# Patient Record
Sex: MALE | Race: BLACK OR AFRICAN AMERICAN | Employment: UNEMPLOYED | URBAN - NONMETROPOLITAN AREA
[De-identification: names, ages, dates, MRNs, and addresses within clinical notes are randomized per-mention and may not be internally consistent; named-entity substitution may affect disease eponyms.]

---

## 2017-01-26 ENCOUNTER — TELEMEDICINE2 (OUTPATIENT)
Dept: MEDICAL GROUP | Facility: PHYSICIAN GROUP | Age: 77
End: 2017-01-26
Payer: OTHER GOVERNMENT

## 2017-01-26 VITALS
WEIGHT: 192 LBS | RESPIRATION RATE: 20 BRPM | BODY MASS INDEX: 27.49 KG/M2 | HEART RATE: 63 BPM | DIASTOLIC BLOOD PRESSURE: 85 MMHG | HEIGHT: 70 IN | OXYGEN SATURATION: 95 % | SYSTOLIC BLOOD PRESSURE: 135 MMHG | TEMPERATURE: 96.8 F

## 2017-01-26 DIAGNOSIS — B20 HIV (HUMAN IMMUNODEFICIENCY VIRUS INFECTION) (HCC): ICD-10-CM

## 2017-01-26 PROCEDURE — 99213 OFFICE O/P EST LOW 20 MIN: CPT | Mod: GT | Performed by: NURSE PRACTITIONER

## 2017-01-26 NOTE — PROGRESS NOTES
"Chief Complaint   Patient presents with   • HIV Positive/AIDS       TELEMEDICINE VISIT    HISTORY OF PRESENT ILLNESS: Patient is a 78 y.o. male established patient, who presents today to discuss:    Verified Identification with patient.    Secured visit with RN presenter @  [facility]    HIV (human immunodeficiency virus infection)  Previous CD4: 842 viral load < 20  Today CD4 - 936 viral load < 20  BUN and Creatinine are improved GFR improved.  Platelets at 118K.  Patient is  sup for parole in 3 months. Will be relocated to  [place] and wants to go to hospitals clinic.    HIV ROS     Medication: taking - prezista, isentress, and norvir  Missed medications: NONE    Weight loss? NO     Night sweats?  NO   Body aches ?   NO     Skin ?  Clear, although has problems with mouth lesions Nonhealing lesions ? NO  Rash  ? NO  Warts  ?  NO     Neuro: No headache, No sensation changes, No dizziness, No confusion.  Cardiac: No cp, No Castro, No peripheral edema. No calf pain at rest.  Pulm: No cough, No sob, No sputum   Gastro: No nausea, No vomiting,No diarrhea, No rectal bleeding, No abdominal pain  : No dysuria. No blisters, No incontinence.   Constitutional : No fevers, No night sweats.  Musculoskeletal: No swelling,redness or pain to joints. Normal ROM and gait  Skin: rashes, lesions, itching  Emotional: depression ? No  anxiety ? No    Psych: hallucinations ?  Auditory hallucinations ? Paranoid  ?        Allergies   Allergen Reactions   • Pcn [Penicillins]          ROS: As documented in my HPI      Exam:  Blood pressure 135/85, pulse 63, temperature 36 °C (96.8 °F), resp. rate 20, height 1.778 m (5' 10\"), weight 87.091 kg (192 lb), SpO2 95 %.  General:  Well nourished, well developed male in NAD  Head: No lesions, Non tender scalp  Neck: Supple. Thyroid is symmetric. No lymphadenopathy   Oral Cavity: no thrush or gum lesions  Pulmonary: .  Normal effort. No rales, ronchi, or wheezing via Telesteth system  Cardiovascular: Regular rate " and rhythm without murmur.   Extremities: no clubbing, cyanosis, or edema.  Psych: Alert and oriented x3. Normal mood and affect.  Neurological: No focal deficits    Please note that this dictation was created using voice recognition software. I have made every reasonable attempt to correct obvious errors, but I expect that there are errors of grammar and possibly content that I did not discover before finalizing the note.    Assessment/Plan:  1. HIV (human immunodeficiency virus infection) (CMS-[facility])     Current status of condition is chronic and controlled on therapy.  Return to clinic in 3 months  Refill HIV meds  Recheck CD4 , VL, CBC, and CMP in 3 months

## 2017-01-26 NOTE — ASSESSMENT & PLAN NOTE
Previous CD4: 842 viral load < 20  Today CD4 - 936 viral load < 20  BUN and Creatinine are improved GFR improved.  Platelets at 118K.  Patient is  sup for parole in 3 months. Will be relocated to  [place] and wants to go to [facility] [place] [facility].    HIV ROS     Medication: taking - prezista, isentress, and norvir  Missed medications: NONE    Weight loss? NO     Night sweats?  NO   Body aches ?   NO     Skin ?  Clear, although has problems with mouth lesions Nonhealing lesions ? NO  Rash  ? NO  Warts  ?  NO     Neuro: No headache, No sensation changes, No dizziness, No confusion.  Cardiac: No cp, No Castro, No peripheral edema. No calf pain at rest.  Pulm: No cough, No sob, No sputum   Gastro: No nausea, No vomiting,No diarrhea, No rectal bleeding, No abdominal pain  : No dysuria. No blisters, No incontinence.   Constitutional : No fevers, No night sweats.  Musculoskeletal: No swelling,redness or pain to joints. Normal ROM and gait  Skin: rashes, lesions, itching  Emotional: depression ? No  anxiety ? No    Psych: hallucinations ?  Auditory hallucinations ? Paranoid  ?

## 2017-04-03 DIAGNOSIS — B20 HIV (HUMAN IMMUNODEFICIENCY VIRUS INFECTION) (HCC): ICD-10-CM

## 2017-04-03 NOTE — TELEPHONE ENCOUNTER
Last refilled 10/2016, w/Prezista and Isentress, but only given 4 refills (11 refills on other two). [facility] requesting. Thanks.

## 2017-04-12 ENCOUNTER — TELEMEDICINE2 (OUTPATIENT)
Dept: MEDICAL GROUP | Facility: PHYSICIAN GROUP | Age: 77
End: 2017-04-12
Payer: OTHER GOVERNMENT

## 2017-04-12 VITALS
DIASTOLIC BLOOD PRESSURE: 89 MMHG | BODY MASS INDEX: 26.98 KG/M2 | OXYGEN SATURATION: 95 % | HEART RATE: 85 BPM | RESPIRATION RATE: 18 BRPM | TEMPERATURE: 97.3 F | SYSTOLIC BLOOD PRESSURE: 128 MMHG | WEIGHT: 188 LBS

## 2017-04-12 DIAGNOSIS — B20 HIV (HUMAN IMMUNODEFICIENCY VIRUS INFECTION) (HCC): ICD-10-CM

## 2017-04-12 PROCEDURE — 99213 OFFICE O/P EST LOW 20 MIN: CPT | Mod: GT | Performed by: NURSE PRACTITIONER

## 2017-04-12 NOTE — PROGRESS NOTES
Chief Complaint   Patient presents with   • HIV Positive/AIDS       TELEMEDICINE VISIT    HISTORY OF PRESENT ILLNESS: Patient is a 79 y.o. male established patient, who presents today to discuss treatment and URI symptoms.    Verified Identification with patient.    Secured visit with RN presenter @ [facility]    HIV (human immunodeficiency virus infection)  This is a 79 year old male with  HIV. Patient has chronic conditions that include:  Patient Active Problem List    Diagnosis Date Noted   • HSV (herpes simplex virus) infection 04/20/2016   • Drug-induced dyspepsia 04/20/2016   • HIV (human immunodeficiency virus infection) (CMS-HCC) 04/22/2014   • Chronic kidney disease (CKD) stage G4/A1, severely decreased glomerular filtration rate (GFR) between 15-29 mL/min/1.73 square meter and albuminuria creatinine ratio less than 30 mg/g (CMS-HCC) 04/22/2014   • HTN (hypertension) 04/22/2014   • Leg pain 04/22/2014   HIV is stable. CD4 357 and viral load is < 20   Patient has chronic CKD with a creatinine at 1.95.  GFR is a 37 - stable.     Patient complains of URI, mostly sinus congestion. Sore throat is present.    HIV ROS     Medication: norvir, isentress Prezista.  Missed medications:  NO     Weight loss?  NO     Night sweats?  NO   Body aches ?   YES, with current URI    Skin ?  Clear  Nonhealing lesions ?  NO  Rash  ?  NO  Warts  ?  NO     Neuro: No headache, No sensation changes, No dizziness, No confusion.  Cardiac: No cp, No Castro, No peripheral edema. No calf pain at rest.  Pulm: COUGH and postnasal drip sore throat  Gastro: No nausea, No vomiting,No diarrhea, No rectal bleeding, No abdominal pain  : No dysuria. No blisters, No incontinence.   Constitutional : No fevers, No night sweats.  Musculoskeletal: No swelling,redness or pain to joints. Normal ROM and gait  Skin: rashes, lesions, itching  Emotional: depression ? NO  anxiety ?  NO    Cough but very dry.     Allergies   Allergen Reactions   • Pcn [Penicillins]                 ROS: As documented in my HPI      Exam:  Blood pressure 128/89, pulse 85, temperature 36.3 °C (97.3 °F), resp. rate 18, weight 85.276 kg (188 lb), SpO2 95 %.  General:  Well nourished, well developed male in NAD  Head: No lesions, Non tender scalp  Neck: Supple. Bilateral lymph swelling. Posterior pharynx red and swollen   Oral Cavity: no thrush or gum lesions  Pulmonary: .  Normal effort. No rales, ronchi, or wheezing via Telesteth system  Cardiovascular: Regular rate and rhythm without murmur.   Extremities: no clubbing, cyanosis, or edema.  Psych: Alert and oriented x3. Normal mood and affect.  Neurological: No focal deficits    Please note that this dictation was created using voice recognition software. I have made every reasonable attempt to correct obvious errors, but I expect that there are errors of grammar and possibly content that I did not discover before finalizing the note.    Assessment/Plan:  1. HIV (human immunodeficiency virus infection) (CMS-[facility])     Current status of condition is chronic and controlled on therapy.  URI: self limiting and COLD PACK ordered.

## 2017-04-12 NOTE — ASSESSMENT & PLAN NOTE
This is a 79 year old male with  HIV. Patient has chronic conditions that include:  Patient Active Problem List    Diagnosis Date Noted   • HSV (herpes simplex virus) infection 04/20/2016   • Drug-induced dyspepsia 04/20/2016   • HIV (human immunodeficiency virus infection) (CMS-HCC) 04/22/2014   • Chronic kidney disease (CKD) stage G4/A1, severely decreased glomerular filtration rate (GFR) between 15-29 mL/min/1.73 square meter and albuminuria creatinine ratio less than 30 mg/g (CMS-HCC) 04/22/2014   • HTN (hypertension) 04/22/2014   • Leg pain 04/22/2014   HIV is stable. CD4 357 and viral load is < 20   Patient has chronic CKD with a creatinine at 1.95.  GFR is a 37 - stable.     Patient complains of URI, mostly sinus congestion. Sore throat is present.    HIV ROS     Medication: norvir, isentress Prezista.  Missed medications:  NO     Weight loss?  NO     Night sweats?  NO   Body aches ?   YES, with current URI    Skin ?  Clear  Nonhealing lesions ?  NO  Rash  ?  NO  Warts  ?  NO     Neuro: No headache, No sensation changes, No dizziness, No confusion.  Cardiac: No cp, No Castro, No peripheral edema. No calf pain at rest.  Pulm: COUGH and postnasal drip sore throat  Gastro: No nausea, No vomiting,No diarrhea, No rectal bleeding, No abdominal pain  : No dysuria. No blisters, No incontinence.   Constitutional : No fevers, No night sweats.  Musculoskeletal: No swelling,redness or pain to joints. Normal ROM and gait  Skin: rashes, lesions, itching  Emotional: depression ? NO  anxiety ?  NO    Cough but very dry.

## 2017-07-12 ENCOUNTER — TELEMEDICINE2 (OUTPATIENT)
Dept: MEDICAL GROUP | Facility: PHYSICIAN GROUP | Age: 77
End: 2017-07-12
Payer: OTHER GOVERNMENT

## 2017-07-12 VITALS
SYSTOLIC BLOOD PRESSURE: 104 MMHG | BODY MASS INDEX: 25.97 KG/M2 | RESPIRATION RATE: 16 BRPM | OXYGEN SATURATION: 93 % | WEIGHT: 181 LBS | TEMPERATURE: 96.8 F | DIASTOLIC BLOOD PRESSURE: 61 MMHG | HEART RATE: 70 BPM

## 2017-07-12 DIAGNOSIS — B20 HIV (HUMAN IMMUNODEFICIENCY VIRUS INFECTION) (HCC): ICD-10-CM

## 2017-07-12 PROCEDURE — 99213 OFFICE O/P EST LOW 20 MIN: CPT | Mod: GT | Performed by: NURSE PRACTITIONER

## 2017-07-12 NOTE — PROGRESS NOTES
Chief Complaint   Patient presents with   • HIV Positive/AIDS       IN PERSON VISIT    HISTORY OF PRESENT ILLNESS: Patient is a 79 y.o. male established patient, who presents today to discuss:     Verified Identification with patient.   RN presenter @ [facility]      HIV ROS     Medication:  Prezista, isentress and norvir   Missed medications:  none    CD4 409   Viral Load < 20   Kidney Function 1.58 ( improved)    Weight loss?  No     Night sweats?  No   Body aches ?   No     Skin ? Clear  Nonhealing lesions ?  No  Rash  ?  No  Warts  ?  No     Neuro: No headache, No sensation changes, No dizziness, No confusion.  Cardiac: No cp, No Castro, No peripheral edema. No calf pain at rest.  Pulm: No cough, No sob, No sputum   Gastro: No nausea, No vomiting,No diarrhea, No rectal bleeding, No abdominal pain  : No dysuria. No blisters, No incontinence.   Constitutional : No fevers, No night sweats.  Musculoskeletal: No swelling,redness or pain to joints. Normal ROM and gait  Skin: rashes, lesions, itching  Emotional: depression ? No  anxiety ?  No    Psych: hallucinations ?   No   Auditory hallucinations ?  No  Paranoid  ?  No     DRUG ---DRUG interaction? No     Severe allergies today.     No problem-specific assessment & plan notes found for this encounter.    Allergies   Allergen Reactions   • Pcn [Penicillins]                ROS: As documented in my HPI      Exam:  Blood pressure 104/61, pulse 70, temperature 36 °C (96.8 °F), resp. rate 16, weight 82.101 kg (181 lb), SpO2 93 %.  General:  Well nourished, well developed male in NAD  Head: No lesions, Non tender scalp  EYES: severe swelling and watery eyes.   Neck: Supple. Thyroid is symmetric. No lymphadenopathy   Oral Cavity: no thrush or gum lesions  Pulmonary: .  Normal effort. No rales, ronchi, or wheezing via Telesteth system  Cardiovascular: Regular rate and rhythm without murmur.   Extremities: no clubbing, cyanosis, or edema.  Psych: Alert and oriented x3. Normal mood  and affect.  Neurological: No focal deficits    Please note that this dictation was created using voice recognition software. I have made every reasonable attempt to correct obvious errors, but I expect that there are errors of grammar and possibly content that I did not discover before finalizing the note.    Assessment/Plan:  1. HIV (human immunodeficiency virus infection) (CMS-HCC)  darunavir (PREZISTA) 600 MG Tab    raltegravir (ISENTRESS) 400 MG Tab    ritonavir (NORVIR) 100 MG Cap      Current status of condition is chronic and controlled on therapy.  Return to clinic in 3 months  Refill HIV meds  Recheck CD4 , VL, CBC, and CMP in 3 months    Use antihistamine and nasal spray as directed from [facility].

## 2017-10-11 ENCOUNTER — TELEMEDICINE2 (OUTPATIENT)
Dept: MEDICAL GROUP | Facility: PHYSICIAN GROUP | Age: 77
End: 2017-10-11
Payer: OTHER GOVERNMENT

## 2017-10-11 VITALS
SYSTOLIC BLOOD PRESSURE: 169 MMHG | HEART RATE: 57 BPM | BODY MASS INDEX: 26.77 KG/M2 | TEMPERATURE: 97.3 F | OXYGEN SATURATION: 92 % | DIASTOLIC BLOOD PRESSURE: 87 MMHG | HEIGHT: 70 IN | RESPIRATION RATE: 20 BRPM | WEIGHT: 187 LBS

## 2017-10-11 DIAGNOSIS — N18.4 CHRONIC KIDNEY DISEASE (CKD) STAGE G4/A1, SEVERELY DECREASED GLOMERULAR FILTRATION RATE (GFR) BETWEEN 15-29 ML/MIN/1.73 SQUARE METER AND ALBUMINURIA CREATININE RATIO LESS THAN 30 MG/G (HCC): ICD-10-CM

## 2017-10-11 DIAGNOSIS — B20 HIV (HUMAN IMMUNODEFICIENCY VIRUS INFECTION) (HCC): ICD-10-CM

## 2017-10-11 PROCEDURE — 99213 OFFICE O/P EST LOW 20 MIN: CPT | Mod: GT | Performed by: NURSE PRACTITIONER

## 2017-10-11 NOTE — ASSESSMENT & PLAN NOTE
Patient reports drinking 5-6 cups of hot tea a day. Patient is taking CTM for allergies. GFr - 34 ( stable and chronic). Cr: 1.86  Discussed tea and CTM - discouraged use. Plans to [place] for appt with Medical. Having mild urinary retention. Taking Flomax. Need to discuss with medical.

## 2017-10-11 NOTE — PROGRESS NOTES
"Chief Complaint   Patient presents with   • HIV Positive/AIDS           HISTORY OF PRESENT ILLNESS: Patient is a 79 y.o. male established patient, who presents today to discuss:     Verified Identification with patient.   RN presenter @ [facility]    Chronic kidney disease (CKD) stage G4/A1, severely decreased glomerular filtration rate (GFR) between 15-29 mL/min/1.73 square meter and albuminuria creatinine ratio less than 30 mg/g  Patient reports drinking 5-6 cups of hot tea a day. Patient is taking CTM for allergies. GFr - 34 ( stable and chronic). Cr: 1.86  Discussed tea and CTM - discouraged use. Plans to [place] for appt with Medical. Having mild urinary retention. Taking Flomax. Need to discuss with medical.     HIV (human immunodeficiency virus infection)  HIV ROS     Medication: Norvir, Isentress, Prezista  Missed medications: NONE     CD4: 818   Viral Load < 20   Kidney Function: Stable but worrisome     Weight loss?  NO     Night sweats?  NO   Body aches ?   NO     Skin ?  NO  Nonhealing lesions ? NO  Rash  ?  NO  Warts  ?  NO     Neuro: No headache, No sensation changes, No dizziness, No confusion.  Cardiac: No cp, No Castro, No peripheral edema. No calf pain at rest.  Pulm: No cough, No sob, No sputum   Gastro: No nausea, No vomiting,No diarrhea, No rectal bleeding, No abdominal pain  : No dysuria. No blisters, No incontinence.   Constitutional : No fevers, No night sweats.  Musculoskeletal: No swelling,redness or pain to joints. Normal ROM and gait  Skin: rashes, lesions, itching  Emotional: depression ? NO  anxiety ?  NO    Psych: hallucinations ?   NO   Auditory hallucinations ?  NO  Paranoid  ?  NO     DRUG ---DRUG interaction? NO       Allergies   Allergen Reactions   • Pcn [Penicillins]                ROS: As documented in my HPI      Exam:  Blood pressure (!) 169/87, pulse (!) 57, temperature 36.3 °C (97.3 °F), resp. rate 20, height 1.778 m (5' 10\"), weight 84.8 kg (187 lb), SpO2 92 %.  General:  Well " nourished, well developed male in NAD  Head: No lesions, Non tender scalp  Neck: Supple. Thyroid is symmetric. No lymphadenopathy   Oral Cavity: no thrush or gum lesions  Pulmonary: .  Normal effort. No rales, ronchi, or wheezing via Telesteth system  Cardiovascular: Regular rate and rhythm without murmur.   Extremities: no clubbing, cyanosis, or edema.  Psych: Alert and oriented x3. Normal mood and affect.  Neurological: No focal deficits    Please note that this dictation was created using voice recognition software. I have made every reasonable attempt to correct obvious errors, but I expect that there are errors of grammar and possibly content that I did not discover before finalizing the note.    Assessment/Plan:  1. Chronic kidney disease (CKD) stage G4/A1, severely decreased glomerular filtration rate (GFR) between 15-29 mL/min/1.73 square meter and albuminuria creatinine ratio less than 30 mg/g (CMS-HCC)   Follow up with Medical    2. HIV (human immunodeficiency virus infection) (CMS-HCC)  Current status of condition is chronic and controlled on therapy.  Return to clinic in 3 months  Refill HIV meds  Recheck CD4 , VL, CBC, and CMP in 3 months

## 2017-10-11 NOTE — ASSESSMENT & PLAN NOTE
HIV ROS     Medication: Norvir, Isentress, Prezista  Missed medications: NONE     CD4: 818   Viral Load < 20   Kidney Function: Stable but worrisome     Weight loss?  NO     Night sweats?  NO   Body aches ?   NO     Skin ?  NO  Nonhealing lesions ? NO  Rash  ?  NO  Warts  ?  NO     Neuro: No headache, No sensation changes, No dizziness, No confusion.  Cardiac: No cp, No Castro, No peripheral edema. No calf pain at rest.  Pulm: No cough, No sob, No sputum   Gastro: No nausea, No vomiting,No diarrhea, No rectal bleeding, No abdominal pain  : No dysuria. No blisters, No incontinence.   Constitutional : No fevers, No night sweats.  Musculoskeletal: No swelling,redness or pain to joints. Normal ROM and gait  Skin: rashes, lesions, itching  Emotional: depression ? NO  anxiety ?  NO    Psych: hallucinations ?   NO   Auditory hallucinations ?  NO  Paranoid  ?  NO     DRUG ---DRUG interaction? NO

## 2018-01-25 ENCOUNTER — TELEMEDICINE2 (OUTPATIENT)
Dept: MEDICAL GROUP | Facility: PHYSICIAN GROUP | Age: 78
End: 2018-01-25
Payer: OTHER GOVERNMENT

## 2018-01-25 VITALS
OXYGEN SATURATION: 95 % | WEIGHT: 183 LBS | SYSTOLIC BLOOD PRESSURE: 169 MMHG | DIASTOLIC BLOOD PRESSURE: 93 MMHG | RESPIRATION RATE: 18 BRPM | TEMPERATURE: 97.8 F | HEIGHT: 70 IN | HEART RATE: 70 BPM | BODY MASS INDEX: 26.2 KG/M2

## 2018-01-25 DIAGNOSIS — B20 HIV (HUMAN IMMUNODEFICIENCY VIRUS INFECTION) (HCC): ICD-10-CM

## 2018-01-25 DIAGNOSIS — L02.91 ABSCESS: ICD-10-CM

## 2018-01-25 DIAGNOSIS — N18.4 CHRONIC KIDNEY DISEASE (CKD) STAGE G4/A1, SEVERELY DECREASED GLOMERULAR FILTRATION RATE (GFR) BETWEEN 15-29 ML/MIN/1.73 SQUARE METER AND ALBUMINURIA CREATININE RATIO LESS THAN 30 MG/G (HCC): ICD-10-CM

## 2018-01-25 DIAGNOSIS — I10 ESSENTIAL HYPERTENSION: ICD-10-CM

## 2018-01-25 DIAGNOSIS — B00.9 HSV (HERPES SIMPLEX VIRUS) INFECTION: ICD-10-CM

## 2018-01-25 PROCEDURE — 99213 OFFICE O/P EST LOW 20 MIN: CPT | Mod: GT | Performed by: NURSE PRACTITIONER

## 2018-01-25 NOTE — PROGRESS NOTES
"Chief Complaint   Patient presents with   • HIV Positive/AIDS           HISTORY OF PRESENT ILLNESS: Patient is a 79 y.o. male established patient, who presents today to discuss:     Verified Identification with patient.   RN presenter @ [facility]    Abscess  Patient reports abscess on his anus. Packed with gauze and apparently getting better. Patient returns to clinic weekly for recheck. Taking antibiotics. Able to have BM.     HIV (human immunodeficiency virus infection)  HIV ROS     Medication: Isentress, Prezista, Norvir   Missed medications: NONE     CD4: 728   Viral Load < 20   Kidney Function: Cr: 1.84 GFR 40  ( patient goes up and down)     Weight loss?  No     Night sweats?  No   Body aches ?  No     Skin ? Abscess on Rectum  Nonhealing lesions ? Yes  Rash  ? NO  Warts  ? NO     Neuro: No headache, No sensation changes, No dizziness, No confusion.  Cardiac: No cp, No Castro, No peripheral edema. No calf pain at rest.  Pulm: No cough, No sob, No sputum   Gastro: No nausea, No vomiting,No diarrhea, No rectal bleeding, No abdominal pain  : No dysuria. No blisters, No incontinence.   Constitutional : No fevers, No night sweats.  Musculoskeletal: No swelling,redness or pain to joints. Normal ROM and gait  Skin: rashes, lesions, itching  Emotional: depression ? No  anxiety ? No    Psych: hallucinations ?  No   Auditory hallucinations ? No  Paranoid  ? No     DRUG ---DRUG interaction? NO           HSV (herpes simplex virus) infection  Currently no lesions.     HTN (hypertension)  Patient sees medical for HTN.    Chronic kidney disease (CKD) stage G4/A1, severely decreased glomerular filtration rate (GFR) between 15-29 mL/min/1.73 square meter and albuminuria creatinine ratio less than 30 mg/g  GFR is 40.    Allergies   Allergen Reactions   • Pcn [Penicillins]                ROS: As documented in my HPI      Exam:  Blood pressure (!) 169/93, pulse 70, temperature 36.6 °C (97.8 °F), resp. rate 18, height 1.778 m (5' 10\"), " weight 83 kg (183 lb), SpO2 95 %.  General:  Well nourished, well developed male in NAD  Head: No lesions, Non tender scalp  Neck: Supple. Thyroid is symmetric. No lymphadenopathy   Oral Cavity: no thrush or gum lesions. Multiple missing teeth.  Pulmonary: .  Normal effort. No rales, ronchi, or wheezing via Telesteth system  Cardiovascular: Regular rate and rhythm without murmur.   Extremities: no clubbing, cyanosis, or edema.  ABSCESS: cannot acces via telemedicine.  Psych: Alert and oriented x3. Normal mood and affect.  Neurological: No focal deficits    Please note that this dictation was created using voice recognition software. I have made every reasonable attempt to correct obvious errors, but I expect that there are errors of grammar and possibly content that I did not discover before finalizing the note.    Assessment/Plan:  1. Abscess   [facility] medical follow up today   2. HIV (human immunodeficiency virus infection) (CMS-HCC)  Current status of condition is chronic and controlled on therapy.  Return to clinic in 3 months  Refill HIV meds  Recheck CD4 , VL, CBC, and CMP in 3 months   3. HSV (herpes simplex virus) infection  stable   4. Essential hypertension  Current status of condition is chronic and controlled on therapy.     5. Chronic kidney disease (CKD) stage G4/A1, severely decreased glomerular filtration rate (GFR) between 15-29 mL/min/1.73 square meter and albuminuria creatinine ratio less than 30 mg/g (CMS-HCC)   Improved. Current status of condition is chronic and controlled on therapy.

## 2018-01-25 NOTE — ASSESSMENT & PLAN NOTE
Patient reports abscess on his anus. Packed with gauze and apparently getting better. Patient returns to clinic weekly for recheck. Taking antibiotics. Able to have BM.

## 2018-01-25 NOTE — ASSESSMENT & PLAN NOTE
HIV ROS     Medication: Isentress, Prezista, Norvir   Missed medications: NONE     CD4: 728   Viral Load < 20   Kidney Function: Cr: 1.84 GFR 40  ( patient goes up and down)     Weight loss?  No     Night sweats?  No   Body aches ?  No     Skin ? Abscess on Rectum  Nonhealing lesions ? Yes  Rash  ? NO  Warts  ? NO     Neuro: No headache, No sensation changes, No dizziness, No confusion.  Cardiac: No cp, No Castro, No peripheral edema. No calf pain at rest.  Pulm: No cough, No sob, No sputum   Gastro: No nausea, No vomiting,No diarrhea, No rectal bleeding, No abdominal pain  : No dysuria. No blisters, No incontinence.   Constitutional : No fevers, No night sweats.  Musculoskeletal: No swelling,redness or pain to joints. Normal ROM and gait  Skin: rashes, lesions, itching  Emotional: depression ? No  anxiety ? No    Psych: hallucinations ?  No   Auditory hallucinations ? No  Paranoid  ? No     DRUG ---DRUG interaction? NO

## 2018-05-17 ENCOUNTER — TELEMEDICINE2 (OUTPATIENT)
Dept: MEDICAL GROUP | Facility: PHYSICIAN GROUP | Age: 78
End: 2018-05-17
Payer: OTHER GOVERNMENT

## 2018-05-17 VITALS
HEIGHT: 70 IN | HEART RATE: 78 BPM | RESPIRATION RATE: 18 BRPM | TEMPERATURE: 98.1 F | BODY MASS INDEX: 26.2 KG/M2 | OXYGEN SATURATION: 92 % | WEIGHT: 183 LBS | DIASTOLIC BLOOD PRESSURE: 97 MMHG | SYSTOLIC BLOOD PRESSURE: 156 MMHG

## 2018-05-17 DIAGNOSIS — N18.4 CHRONIC KIDNEY DISEASE (CKD) STAGE G4/A1, SEVERELY DECREASED GLOMERULAR FILTRATION RATE (GFR) BETWEEN 15-29 ML/MIN/1.73 SQUARE METER AND ALBUMINURIA CREATININE RATIO LESS THAN 30 MG/G (HCC): ICD-10-CM

## 2018-05-17 DIAGNOSIS — N40.1 BENIGN PROSTATIC HYPERPLASIA WITH URINARY OBSTRUCTION: ICD-10-CM

## 2018-05-17 DIAGNOSIS — B20 HIV (HUMAN IMMUNODEFICIENCY VIRUS INFECTION) (HCC): ICD-10-CM

## 2018-05-17 DIAGNOSIS — N13.8 BENIGN PROSTATIC HYPERPLASIA WITH URINARY OBSTRUCTION: ICD-10-CM

## 2018-05-17 PROCEDURE — 99213 OFFICE O/P EST LOW 20 MIN: CPT | Mod: GT | Performed by: NURSE PRACTITIONER

## 2018-05-17 NOTE — PROGRESS NOTES
No chief complaint on file.          HISTORY OF PRESENT ILLNESS: Patient is a 80 y.o. male established patient, who presents today to discuss labs .     Verified Identification with patient.   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 He questions the new generic flomax medication and his symptoms.     Chronic kidney disease (CKD) stage G4/A1, severely decreased glomerular filtration rate (GFR) between 15-29 mL/min/1.73 square meter and albuminuria creatinine ratio less than 30 mg/g  Creatinine is 1.91  GFR is stable.     HIV (human immunodeficiency virus infection)  This is a 80 year old male with HIV. He has multiple chronic illnesses that he does see Medical for at [facility].  Patient has BPH that seems to be worsening, chronic kidney disease severe but stable, not very well controlled hypertension, periodic constipation, and stable HIV.  Patient states that he has been doing push-ups and walking ER for exercise.  He does use salt in his diet.  HIV ROS     Medication:   Current Outpatient Prescriptions:   •  darunavir, 600 mg, Oral, BID WITH MEALS, 5/17/2018  •  raltegravir, 400 mg, Oral, BID, 5/17/2018  •  ritonavir, Take one tablet bid, 5/17/2018  •  nitroglycerin CR, Take 2.5 mg by mouth., 5/17/2018  •  psyllium, 1 Packet, Oral, DAILY, 5/17/2018  •  raNITidine, Take 150 mg by mouth., 5/17/2018  •  atenolol, 25 mg, Oral, BID, 5/17/2018  •  aspirin, 325 mg, Oral, Q6HRS PRN, 5/17/2018  •  loperamide, 2 mg, Oral, 4X/DAY PRN, 5/17/2018  •  tamsulosin, 0.4 mg, Oral, AFTER BREAKFAST, 5/17/2018  •  levothyroxine, 112 mcg, Oral, DAILY, 5/17/2018  •  allopurinol, 100 mg, Oral, DAILY, 5/17/2018  •  multivitamin, 1 Tab, Oral, DAILY, 5/17/2018  •  furosemide, 20 mg, Oral, BID, 5/17/2018  •  potassium chloride, 20 mEq, Oral, BID, 5/17/2018  •  losartan, 100 mg, Oral, DAILY, 5/17/2018  •  atorvastatin, 20 mg, Oral, Nightly, 5/17/2018  •  doxazosin, 2  "mg, Oral, DAILY, 5/17/2018    Missed medications: non    CD4 858   Viral Load < 20   Kidney Function: Chronic kidney disease creatinine is 1.91 with GFR 38 BUN 30 no anemia      Weight loss? No     Night sweats? No   Body aches ?  No     Skin ? Clear but dry skin nonhealing lesions ? No  Rash  ? No  Warts  ? No     Neuro: No headache, No sensation changes, No dizziness, No confusion.  Cardiac: No cp, No Castro, No peripheral edema. No calf pain at rest.  Pulm: No cough, No sob, No sputum   Gastro: No nausea, No vomiting,No diarrhea, No rectal bleeding, No abdominal pain  : No dysuria. No blisters, No incontinence.   Constitutional : No fevers, No night sweats.  Musculoskeletal: No swelling,redness or pain to joints. Normal ROM and gait  Skin: rashes, lesions, itching  Emotional: depression ? No  anxiety ? No    Psych: hallucinations ?   No   Auditory hallucinations ? No  Paranoid  ?  No     DRUG ---DRUG interaction? No          Allergies   Allergen Reactions   • Pcn [Penicillins]                ROS: As documented in my HPI      Exam:  Blood pressure 156/97, pulse 78, temperature 36.7 °C (98.1 °F), resp. rate 18, height 1.778 m (5' 10\"), weight 83 kg (183 lb), SpO2 92 %.  General:  Well nourished, well developed male in NAD  Head: No lesions, Non tender scalp  Neck: Supple. Thyroid is symmetric. No lymphadenopathy   Oral Cavity: no thrush or gum lesions  Pulmonary: .  Normal effort. No rales, ronchi, or wheezing via Telesteth system  Cardiovascular: Regular rate and rhythm without murmur.  S1-S2 slight irregularity noted  Extremities: no clubbing, cyanosis +1 edema pretibial  Psych: Alert and oriented x3. Normal mood and affect.  Neurological: No focal deficits    Please note that this dictation was created using voice recognition software. I have made every reasonable attempt to correct obvious errors, but I expect that there are errors of grammar and possibly content that I did not discover before finalizing the " note.    Assessment/Plan:  1. Benign prostatic hyperplasia with urinary obstruction   patient needs to follow-up with infirmary and regular medical doctor at [facility]   2. Chronic kidney disease (CKD) stage G4/A1, severely decreased glomerular filtration rate (GFR) between 15-29 mL/min/1.73 square meter and albuminuria creatinine ratio less than 30 mg/g ([facility])   follow-up with medical doctor.[facility]   3. HIV (human immunodeficiency virus infection) ([facility])   Current status of condition is chronic and controlled on therapy.  Return to clinic in 3 months  Refill HIV meds  Recheck CD4 , VL, CBC, and CMP in 3 months

## 2018-05-17 NOTE — ASSESSMENT & PLAN NOTE
This is a 80 year old male with HIV. He has multiple chronic illnesses that he does see Medical for at [facility].  Patient has BPH that seems to be worsening, chronic kidney disease severe but stable, not very well controlled hypertension, periodic constipation, and stable HIV.  Patient states that he has been doing push-ups and walking ER for exercise.  He does use salt in his diet.  HIV ROS     Medication:   Current Outpatient Prescriptions:   •  darunavir, 600 mg, Oral, BID WITH MEALS, 5/17/2018  •  raltegravir, 400 mg, Oral, BID, 5/17/2018  •  ritonavir, Take one tablet bid, 5/17/2018  •  nitroglycerin CR, Take 2.5 mg by mouth., 5/17/2018  •  psyllium, 1 Packet, Oral, DAILY, 5/17/2018  •  raNITidine, Take 150 mg by mouth., 5/17/2018  •  atenolol, 25 mg, Oral, BID, 5/17/2018  •  aspirin, 325 mg, Oral, Q6HRS PRN, 5/17/2018  •  loperamide, 2 mg, Oral, 4X/DAY PRN, 5/17/2018  •  tamsulosin, 0.4 mg, Oral, AFTER BREAKFAST, 5/17/2018  •  levothyroxine, 112 mcg, Oral, DAILY, 5/17/2018  •  allopurinol, 100 mg, Oral, DAILY, 5/17/2018  •  multivitamin, 1 Tab, Oral, DAILY, 5/17/2018  •  furosemide, 20 mg, Oral, BID, 5/17/2018  •  potassium chloride, 20 mEq, Oral, BID, 5/17/2018  •  losartan, 100 mg, Oral, DAILY, 5/17/2018  •  atorvastatin, 20 mg, Oral, Nightly, 5/17/2018  •  doxazosin, 2 mg, Oral, DAILY, 5/17/2018    Missed medications: non    CD4 858   Viral Load < 20   Kidney Function: Chronic kidney disease creatinine is 1.91 with GFR 38 BUN 30 no anemia      Weight loss? No     Night sweats? No   Body aches ?  No     Skin ? Clear but dry skin nonhealing lesions ? No  Rash  ? No  Warts  ? No     Neuro: No headache, No sensation changes, No dizziness, No confusion.  Cardiac: No cp, No Castro, No peripheral edema. No calf pain at rest.  Pulm: No cough, No sob, No sputum   Gastro: No nausea, No vomiting,No diarrhea, No rectal bleeding, No abdominal pain  : No dysuria. No blisters, No incontinence.   Constitutional : No fevers, No  night sweats.  Musculoskeletal: No swelling,redness or pain to joints. Normal ROM and gait  Skin: rashes, lesions, itching  Emotional: depression ? No  anxiety ? No    Psych: hallucinations ?   No   Auditory hallucinations ? No  Paranoid  ?  No     DRUG ---DRUG interaction? No

## 2018-05-17 NOTE — ASSESSMENT & PLAN NOTE
Patient has problems with bladder emptying. He questions the new generic flomax medication and his symptoms.

## 2018-07-11 ENCOUNTER — TELEMEDICINE2 (OUTPATIENT)
Dept: MEDICAL GROUP | Facility: PHYSICIAN GROUP | Age: 78
End: 2018-07-11
Payer: OTHER GOVERNMENT

## 2018-07-11 VITALS
HEIGHT: 70 IN | OXYGEN SATURATION: 92 % | TEMPERATURE: 98.9 F | DIASTOLIC BLOOD PRESSURE: 81 MMHG | BODY MASS INDEX: 25.05 KG/M2 | SYSTOLIC BLOOD PRESSURE: 123 MMHG | HEART RATE: 84 BPM | WEIGHT: 175 LBS

## 2018-07-11 DIAGNOSIS — N18.4 CHRONIC KIDNEY DISEASE (CKD) STAGE G4/A1, SEVERELY DECREASED GLOMERULAR FILTRATION RATE (GFR) BETWEEN 15-29 ML/MIN/1.73 SQUARE METER AND ALBUMINURIA CREATININE RATIO LESS THAN 30 MG/G (HCC): ICD-10-CM

## 2018-07-11 DIAGNOSIS — N40.1 BENIGN PROSTATIC HYPERPLASIA WITH URINARY OBSTRUCTION: ICD-10-CM

## 2018-07-11 DIAGNOSIS — B20 HIV (HUMAN IMMUNODEFICIENCY VIRUS INFECTION) (HCC): ICD-10-CM

## 2018-07-11 DIAGNOSIS — I10 ESSENTIAL HYPERTENSION: ICD-10-CM

## 2018-07-11 DIAGNOSIS — N13.8 BENIGN PROSTATIC HYPERPLASIA WITH URINARY OBSTRUCTION: ICD-10-CM

## 2018-07-11 DIAGNOSIS — K05.10 GINGIVITIS: ICD-10-CM

## 2018-07-11 PROCEDURE — 99213 OFFICE O/P EST LOW 20 MIN: CPT | Performed by: NURSE PRACTITIONER

## 2018-07-11 ASSESSMENT — PAIN SCALES - GENERAL: PAINLEVEL: NO PAIN

## 2018-07-11 NOTE — ASSESSMENT & PLAN NOTE
Continues to have chronic kidney disease. Stable at current rate.  Discussed No salt, hydration and continue to take medications as directed.

## 2018-07-11 NOTE — ASSESSMENT & PLAN NOTE
HIV ROS     Medication: norvir, prezista 600 mg bid, isentress 400 mg bid  Missed medications: none    CD4 - 1152  Viral Load - 20  Kidney Function: Stable at 1.86 and BUN 47  Platelets : 108  Hgb: 12. 3    Weight loss? Yes, on purpose. Decreased junk food.     Night sweats? No   Body aches ?  No  NOCTURIA ++    Skin ? No new lesions Nonhealing lesions ? No  Rash  ? No Warts  ? No    Neuro: No headache, No sensation changes, No dizziness, No confusion.  Cardiac: No cp, No Castro, No peripheral edema. No calf pain at rest.  Pulm: No cough, No sob, No sputum   Gastro: No nausea, No vomiting,No diarrhea, No rectal bleeding, No abdominal pain  : No dysuria. No blisters, No incontinence.   Constitutional : No fevers, No night sweats.  Musculoskeletal: No swelling,redness or pain to joints. Normal ROM and gait  Skin: rashes, lesions, itching  Emotional: depression ? No  anxiety ? - yes due to Nocturia  Psych: hallucinations ?  No  Auditory hallucinations ? No  Paranoid  ? No     DRUG ---DRUG interaction? NO     Recent spider bite with cellulitis. Fully healed left middle finger

## 2018-07-11 NOTE — ASSESSMENT & PLAN NOTE
"/81   Pulse 84   Temp 37.2 °C (98.9 °F)   Ht 1.778 m (5' 10\")   Wt 79.4 kg (175 lb)   SpO2 92%   BMI 25.11 kg/m²   Continues to be monitored by [facility].  No chest pain  No sob  No peripheral edema  "

## 2018-07-11 NOTE — PROGRESS NOTES
"Chief Complaint   Patient presents with   • HIV Positive/AIDS           HISTORY OF PRESENT ILLNESS: Patient is a 80 y.o. male established patient,  who presents today to discuss:     Verified Identification with patient.   RN present.  In person visit.     HIV (human immunodeficiency virus infection)  HIV ROS     Medication: norvir, prezista 600 mg bid, isentress 400 mg bid  Missed medications: none    CD4 - 1152  Viral Load - 20  Kidney Function: Stable at 1.86 and BUN 47  Platelets : 108  Hgb: 12. 3    Weight loss? Yes, on purpose. Decreased junk food.     Night sweats? No   Body aches ?  No  NOCTURIA ++    Skin ? No new lesions Nonhealing lesions ? No  Rash  ? No Warts  ? No    Neuro: No headache, No sensation changes, No dizziness, No confusion.  Cardiac: No cp, No Castro, No peripheral edema. No calf pain at rest.  Pulm: No cough, No sob, No sputum   Gastro: No nausea, No vomiting,No diarrhea, No rectal bleeding, No abdominal pain  : No dysuria. No blisters, No incontinence.   Constitutional : No fevers, No night sweats.  Musculoskeletal: No swelling,redness or pain to joints. Normal ROM and gait  Skin: rashes, lesions, itching  Emotional: depression ? No  anxiety ? - yes due to Nocturia  Psych: hallucinations ?  No  Auditory hallucinations ? No  Paranoid  ? No     DRUG ---DRUG interaction? NO     Recent spider bite with cellulitis. Fully healed left middle finger    Benign prostatic hyperplasia with urinary obstruction  [facility] monitors.: recommended follow up for Chronic Care. Nocturia    Chronic kidney disease (CKD) stage G4/A1, severely decreased glomerular filtration rate (GFR) between 15-29 mL/min/1.73 square meter and albuminuria creatinine ratio less than 30 mg/g  Continues to have chronic kidney disease. Stable at current rate.  Discussed No salt, hydration and continue to take medications as directed.     HTN (hypertension)  /81   Pulse 84   Temp 37.2 °C (98.9 °F)   Ht 1.778 m (5' 10\")   Wt 79.4 " "kg (175 lb)   SpO2 92%   BMI 25.11 kg/m²    Continues to be monitored by [facility].  No chest pain  No sob  No peripheral edema    Gingivitis  Patient has top dentures.  Lower teeth with plaque and loose teeth. Recommended kite to dentist.    Allergies   Allergen Reactions   • Pcn [Penicillins]                ROS: As documented in my HPI      Exam:  Blood pressure 123/81, pulse 84, temperature 37.2 °C (98.9 °F), height 1.778 m (5' 10\"), weight 79.4 kg (175 lb), SpO2 92 %.  General:  Well nourished, well developed male in NAD  Head: No lesions, Non tender scalp  Neck: Supple. Thyroid is symmetric. No lymphadenopathy   Oral Cavity: no lesions. Poor teeth lower. plaque  Pulmonary: .  Normal effort. No rales, ronchi, or wheezing via Telesteth system  Cardiovascular: Regular rate and rhythm without murmur.   Extremities: no clubbing, cyanosis, or edema.  Psych: Alert and oriented x3. Normal mood and affect.  Neurological: No focal deficits    Please note that this dictation was created using voice recognition software. I have made every reasonable attempt to correct obvious errors, but I expect that there are errors of grammar and possibly content that I did not discover before finalizing the note.    Assessment/Plan:  1. HIV (human immunodeficiency virus infection) ([facility])   Current status of condition is chronic and controlled on therapy.  Return to clinic in 3 months  Refill HIV meds  Recheck CD4 , VL, CBC, and CMP in 3 months   2. Benign prostatic hyperplasia with urinary obstruction  FOLLOW UP with [facility]   3. Chronic kidney disease (CKD) stage G4/A1, severely decreased glomerular filtration rate (GFR) between 15-29 mL/min/1.73 square meter and albuminuria creatinine ratio less than 30 mg/g ([facility])  FOLLOW UP WITH [facility]   4. Essential hypertension  Current status of condition is chronic and controlled on therapy.     5. Gingivitis   KITE FOR DENTIST.            "

## 2018-10-10 ENCOUNTER — TELEMEDICINE2 (OUTPATIENT)
Dept: MEDICAL GROUP | Facility: PHYSICIAN GROUP | Age: 78
End: 2018-10-10
Payer: OTHER GOVERNMENT

## 2018-10-10 VITALS
WEIGHT: 185 LBS | OXYGEN SATURATION: 95 % | DIASTOLIC BLOOD PRESSURE: 89 MMHG | TEMPERATURE: 97.5 F | SYSTOLIC BLOOD PRESSURE: 149 MMHG | BODY MASS INDEX: 25.9 KG/M2 | HEART RATE: 69 BPM | HEIGHT: 71 IN

## 2018-10-10 DIAGNOSIS — N18.4 CHRONIC KIDNEY DISEASE (CKD) STAGE G4/A1, SEVERELY DECREASED GLOMERULAR FILTRATION RATE (GFR) BETWEEN 15-29 ML/MIN/1.73 SQUARE METER AND ALBUMINURIA CREATININE RATIO LESS THAN 30 MG/G (HCC): ICD-10-CM

## 2018-10-10 DIAGNOSIS — B20 HIV (HUMAN IMMUNODEFICIENCY VIRUS INFECTION) (HCC): ICD-10-CM

## 2018-10-10 DIAGNOSIS — N40.1 BENIGN PROSTATIC HYPERPLASIA WITH URINARY OBSTRUCTION: ICD-10-CM

## 2018-10-10 DIAGNOSIS — N13.8 BENIGN PROSTATIC HYPERPLASIA WITH URINARY OBSTRUCTION: ICD-10-CM

## 2018-10-10 PROCEDURE — 99213 OFFICE O/P EST LOW 20 MIN: CPT | Performed by: NURSE PRACTITIONER

## 2018-10-10 NOTE — PROGRESS NOTES
Chief Complaint   Patient presents with   • HIV Positive/AIDS           HISTORY OF PRESENT ILLNESS: Patient is a 80 y.o. male established patient,  who presents today to discuss:     Verified Identification with patient.   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  HIV ROS     Medication:   Current Outpatient Prescriptions:   •  darunavir, 600 mg, Oral, BID WITH MEALS, Taking  •  raltegravir, 400 mg, Oral, BID, Taking  •  ritonavir, Take one tablet bid, Taking  •  nitroglycerin CR, Take 2.5 mg by mouth., Taking  •  psyllium, 1 Packet, Oral, DAILY, Taking  •  raNITidine, Take 150 mg by mouth., Taking  •  atenolol, 25 mg, Oral, BID, Taking  •  aspirin, 325 mg, Oral, Q6HRS PRN, Taking  •  loperamide, 2 mg, Oral, 4X/DAY PRN, Taking  •  tamsulosin, 0.4 mg, Oral, AFTER BREAKFAST, Taking  •  levothyroxine, 112 mcg, Oral, DAILY, Taking  •  allopurinol, 100 mg, Oral, DAILY, Taking  •  multivitamin, 1 Tab, Oral, DAILY, Taking  •  furosemide, 20 mg, Oral, BID, Taking  •  potassium chloride, 20 mEq, Oral, BID, Taking  •  losartan, 100 mg, Oral, DAILY, Taking  •  atorvastatin, 20 mg, Oral, Nightly, Taking  •  doxazosin, 2 mg, Oral, DAILY, Taking    Missed medications: no .    CD4 UNFORTUNATELY not drawn, pass levels very normal   Viral Load < 20   Kidney Function : improved. Creatinine: 1. 83 GFR not done to inaccurate information     Weight loss? no .    Night sweats?no .   Body aches ?  no .    Skin ? no rashes, no lesions, no petechiae or ecchymosis, no blisters or vesicles, Skin color, texture, turgor normal. No rashes or lesions.   Neuro: No headache, No sensation changes, No dizziness, No confusion.  Cardiac: No cp, No Castro, No peripheral edema. No calf pain at rest.  Pulm: No cough, No sob, No sputum   Gastro: No nausea, No vomiting,No diarrhea, No rectal bleeding, No abdominal pain  : No dysuria. No blisters, No incontinence.   Constitutional : No fevers, No night  "sweats.  Musculoskeletal: No swelling,redness or pain to joints. Normal ROM and gait  Skin: rashes, lesions, itching  PHQ 2 negative Depression Screening    Little interest or pleasure in doing things?      Feeling down, depressed , or hopeless?     Trouble falling or staying asleep, or sleeping too much?      Feeling tired or having little energy?      Poor appetite or overeating?      Feeling bad about yourself - or that you are a failure or have let yourself or your family down?     Trouble concentrating on things, such as reading the newspaper or watching television?     Moving or speaking so slowly that other people could have noticed.  Or the opposite - being so fidgety or restless that you have been moving around a lot more than usual?      Thoughts that you would be better off dead, or of hurting yourself?      Patient Health Questionnaire Score:         If depressive symptoms identified deferred to follow up visit unless specifically addressed in assesment and plan.    Interpretation of PHQ-9 Total Score   Score Severity   1-4 No Depression   5-9 Mild Depression   10-14 Moderate Depression   15-19 Moderately Severe Depression   20-27 Severe Depression      Psych: hallucinations ? no .     Auditory hallucinations ? no .   Paranoid  ? no .    DRUG ---DRUG interaction? no .      Benign prostatic hyperplasia with urinary obstruction  [facility] medical has doubled Flomax daily. No side effects.     Chronic kidney disease (CKD) stage G4/A1, severely decreased glomerular filtration rate (GFR) between 15-29 mL/min/1.73 square meter and albuminuria creatinine ratio less than 30 mg/g  NO GFR due to lack of information. Cr 1.83.  No swelling  No chest pain  No SOB    Allergies   Allergen Reactions   • Pcn [Penicillins]                ROS: As documented in my HPI      Exam:  Blood pressure 149/89, pulse 69, temperature 36.4 °C (97.5 °F), height 1.803 m (5' 11\"), weight 83.9 kg (185 lb), SpO2 95 %.  General:  Well nourished, " well developed male in NAD  Head: No lesions, Non tender scalp  Neck: Supple. Thyroid is symmetric. No lymphadenopathy   Oral Cavity: no thrush or gum lesions  Pulmonary: .  Normal effort. No rales, ronchi, or wheezing via Telesteth system  Cardiovascular: Regular rate and rhythm without murmur.   Extremities: no clubbing, cyanosis, or edema.  Psych: Alert and oriented x3. Normal mood and affect.  Neurological: No focal deficits    Please note that this dictation was created using voice recognition software. I have made every reasonable attempt to correct obvious errors, but I expect that there are errors of grammar and possibly content that I did not discover before finalizing the note.    Assessment/Plan:  1. HIV (human immunodeficiency virus infection) ([facility])   Current status of condition is chronic and controlled on therapy.  Return to clinic in 3 months  Refill HIV meds  Recheck CD4 , VL, CBC, and CMP in 3 months   2. Benign prostatic hyperplasia with urinary obstruction   improved on double dose   3. Chronic kidney disease (CKD) stage G4/A1, severely decreased glomerular filtration rate (GFR) between 15-29 mL/min/1.73 square meter and albuminuria creatinine ratio less than 30 mg/g ([facility])  Need more current labs. Although seems stable.

## 2018-10-10 NOTE — ASSESSMENT & PLAN NOTE
Patient here to review labs.  HIV ROS     Medication:   Current Outpatient Prescriptions:   •  darunavir, 600 mg, Oral, BID WITH MEALS, Taking  •  raltegravir, 400 mg, Oral, BID, Taking  •  ritonavir, Take one tablet bid, Taking  •  nitroglycerin CR, Take 2.5 mg by mouth., Taking  •  psyllium, 1 Packet, Oral, DAILY, Taking  •  raNITidine, Take 150 mg by mouth., Taking  •  atenolol, 25 mg, Oral, BID, Taking  •  aspirin, 325 mg, Oral, Q6HRS PRN, Taking  •  loperamide, 2 mg, Oral, 4X/DAY PRN, Taking  •  tamsulosin, 0.4 mg, Oral, AFTER BREAKFAST, Taking  •  levothyroxine, 112 mcg, Oral, DAILY, Taking  •  allopurinol, 100 mg, Oral, DAILY, Taking  •  multivitamin, 1 Tab, Oral, DAILY, Taking  •  furosemide, 20 mg, Oral, BID, Taking  •  potassium chloride, 20 mEq, Oral, BID, Taking  •  losartan, 100 mg, Oral, DAILY, Taking  •  atorvastatin, 20 mg, Oral, Nightly, Taking  •  doxazosin, 2 mg, Oral, DAILY, Taking    Missed medications: no .    CD4 UNFORTUNATELY not drawn, pass levels very normal   Viral Load < 20   Kidney Function : improved. Creatinine: 1. 83 GFR not done to inaccurate information     Weight loss? no .    Night sweats?no .   Body aches ?  no .    Skin ? no rashes, no lesions, no petechiae or ecchymosis, no blisters or vesicles, Skin color, texture, turgor normal. No rashes or lesions.   Neuro: No headache, No sensation changes, No dizziness, No confusion.  Cardiac: No cp, No Castro, No peripheral edema. No calf pain at rest.  Pulm: No cough, No sob, No sputum   Gastro: No nausea, No vomiting,No diarrhea, No rectal bleeding, No abdominal pain  : No dysuria. No blisters, No incontinence.   Constitutional : No fevers, No night sweats.  Musculoskeletal: No swelling,redness or pain to joints. Normal ROM and gait  Skin: rashes, lesions, itching  PHQ 2 negative Depression Screening    Little interest or pleasure in doing things?      Feeling down, depressed , or hopeless?     Trouble falling or staying asleep, or  sleeping too much?      Feeling tired or having little energy?      Poor appetite or overeating?      Feeling bad about yourself - or that you are a failure or have let yourself or your family down?     Trouble concentrating on things, such as reading the newspaper or watching television?     Moving or speaking so slowly that other people could have noticed.  Or the opposite - being so fidgety or restless that you have been moving around a lot more than usual?      Thoughts that you would be better off dead, or of hurting yourself?      Patient Health Questionnaire Score:         If depressive symptoms identified deferred to follow up visit unless specifically addressed in assesment and plan.    Interpretation of PHQ-9 Total Score   Score Severity   1-4 No Depression   5-9 Mild Depression   10-14 Moderate Depression   15-19 Moderately Severe Depression   20-27 Severe Depression      Psych: hallucinations ? no .     Auditory hallucinations ? no .   Paranoid  ? no .    DRUG ---DRUG interaction? no .

## 2019-01-24 ENCOUNTER — TELEMEDICINE2 (OUTPATIENT)
Dept: INFECTIOUS DISEASES | Facility: MEDICAL CENTER | Age: 79
End: 2019-01-24
Payer: OTHER GOVERNMENT

## 2019-01-24 DIAGNOSIS — B20 HIV (HUMAN IMMUNODEFICIENCY VIRUS INFECTION) (HCC): ICD-10-CM

## 2019-01-24 PROCEDURE — 99213 OFFICE O/P EST LOW 20 MIN: CPT | Performed by: INTERNAL MEDICINE

## 2019-01-28 ASSESSMENT — ENCOUNTER SYMPTOMS
SHORTNESS OF BREATH: 0
BACK PAIN: 0
ABDOMINAL PAIN: 0
DIARRHEA: 1
VOMITING: 0
NAUSEA: 0
CHILLS: 0
FEVER: 0
MYALGIAS: 0
COUGH: 0
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
## 2019-07-24 ENCOUNTER — TELEMEDICINE2 (OUTPATIENT)
Dept: VASCULAR LAB | Facility: MEDICAL CENTER | Age: 79
End: 2019-07-24
Attending: INTERNAL MEDICINE
Payer: OTHER GOVERNMENT

## 2019-07-24 DIAGNOSIS — N40.1 BENIGN PROSTATIC HYPERPLASIA WITH URINARY OBSTRUCTION: ICD-10-CM

## 2019-07-24 DIAGNOSIS — B20 HIV INFECTION, UNSPECIFIED SYMPTOM STATUS (HCC): ICD-10-CM

## 2019-07-24 DIAGNOSIS — N18.4 CHRONIC KIDNEY DISEASE (CKD) STAGE G4/A1, SEVERELY DECREASED GLOMERULAR FILTRATION RATE (GFR) BETWEEN 15-29 ML/MIN/1.73 SQUARE METER AND ALBUMINURIA CREATININE RATIO LESS THAN 30 MG/G (HCC): ICD-10-CM

## 2019-07-24 DIAGNOSIS — I10 ESSENTIAL HYPERTENSION: ICD-10-CM

## 2019-07-24 DIAGNOSIS — N13.8 BENIGN PROSTATIC HYPERPLASIA WITH URINARY OBSTRUCTION: ICD-10-CM

## 2019-07-24 PROCEDURE — 99214 OFFICE O/P EST MOD 30 MIN: CPT | Performed by: INTERNAL MEDICINE
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
## 2019-10-22 ENCOUNTER — TELEMEDICINE2 (OUTPATIENT)
Dept: VASCULAR LAB | Facility: MEDICAL CENTER | Age: 79
End: 2019-10-22
Attending: INTERNAL MEDICINE
Payer: OTHER GOVERNMENT

## 2019-10-22 DIAGNOSIS — D69.6 THROMBOCYTOPENIA (HCC): ICD-10-CM

## 2019-10-22 DIAGNOSIS — B20 HIV DISEASE (HCC): ICD-10-CM

## 2019-10-22 DIAGNOSIS — E78.49 OTHER HYPERLIPIDEMIA: ICD-10-CM

## 2019-10-22 DIAGNOSIS — N18.4 CHRONIC KIDNEY DISEASE (CKD) STAGE G4/A1, SEVERELY DECREASED GLOMERULAR FILTRATION RATE (GFR) BETWEEN 15-29 ML/MIN/1.73 SQUARE METER AND ALBUMINURIA CREATININE RATIO LESS THAN 30 MG/G (HCC): ICD-10-CM

## 2019-10-22 PROBLEM — K05.10 GINGIVITIS: Status: RESOLVED | Noted: 2018-07-11 | Resolved: 2019-10-22

## 2019-10-22 PROBLEM — L02.91 ABSCESS: Status: RESOLVED | Noted: 2018-01-25 | Resolved: 2019-10-22

## 2019-10-22 PROCEDURE — 99214 OFFICE O/P EST MOD 30 MIN: CPT | Performed by: INTERNAL MEDICINE

## 2020-02-11 ENCOUNTER — DOCUMENTATION (OUTPATIENT)
Dept: VASCULAR LAB | Facility: MEDICAL CENTER | Age: 80
End: 2020-02-11

## 2020-02-11 DIAGNOSIS — B20 HIV DISEASE (HCC): ICD-10-CM

## 2020-02-11 NOTE — PROGRESS NOTES
Reviewed labs received 2/6, BMP from 1/30 showing elevated Cr of 2.93 (progression of CKD vs. ASHLEE)  Repeat BMP ASAP
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
## 2020-02-26 ENCOUNTER — TELEMEDICINE2 (OUTPATIENT)
Dept: VASCULAR LAB | Facility: MEDICAL CENTER | Age: 80
End: 2020-02-26
Attending: INTERNAL MEDICINE
Payer: OTHER GOVERNMENT

## 2020-02-26 DIAGNOSIS — N18.4 CHRONIC KIDNEY DISEASE (CKD) STAGE G4/A1, SEVERELY DECREASED GLOMERULAR FILTRATION RATE (GFR) BETWEEN 15-29 ML/MIN/1.73 SQUARE METER AND ALBUMINURIA CREATININE RATIO LESS THAN 30 MG/G (HCC): ICD-10-CM

## 2020-02-26 DIAGNOSIS — N17.9 AKI (ACUTE KIDNEY INJURY) (HCC): ICD-10-CM

## 2020-02-26 DIAGNOSIS — B20 HIV DISEASE (HCC): ICD-10-CM

## 2020-02-26 PROCEDURE — 99213 OFFICE O/P EST LOW 20 MIN: CPT | Performed by: INTERNAL MEDICINE

## 2020-05-20 ENCOUNTER — TELEMEDICINE2 (OUTPATIENT)
Dept: VASCULAR LAB | Facility: MEDICAL CENTER | Age: 80
End: 2020-05-20
Attending: INTERNAL MEDICINE
Payer: OTHER GOVERNMENT

## 2020-05-20 DIAGNOSIS — E78.49 OTHER HYPERLIPIDEMIA: ICD-10-CM

## 2020-05-20 DIAGNOSIS — N18.4 CHRONIC KIDNEY DISEASE (CKD) STAGE G4/A1, SEVERELY DECREASED GLOMERULAR FILTRATION RATE (GFR) BETWEEN 15-29 ML/MIN/1.73 SQUARE METER AND ALBUMINURIA CREATININE RATIO LESS THAN 30 MG/G (HCC): ICD-10-CM

## 2020-05-20 DIAGNOSIS — B20 HIV DISEASE (HCC): ICD-10-CM

## 2020-05-20 DIAGNOSIS — I10 ESSENTIAL HYPERTENSION: ICD-10-CM

## 2020-05-20 PROBLEM — N17.9 AKI (ACUTE KIDNEY INJURY) (HCC): Status: RESOLVED | Noted: 2020-02-26 | Resolved: 2020-05-20

## 2020-05-20 PROCEDURE — 99214 OFFICE O/P EST MOD 30 MIN: CPT | Performed by: INTERNAL MEDICINE
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
## 2020-06-15 DIAGNOSIS — B20 HIV INFECTION, UNSPECIFIED SYMPTOM STATUS (HCC): ICD-10-CM

## 2020-06-15 RX ORDER — RALTEGRAVIR 400 MG/1
400 TABLET, FILM COATED ORAL 2 TIMES DAILY
Qty: 60 TAB | Refills: 5 | Status: SHIPPED | OUTPATIENT
Start: 2020-06-16 | End: 2021-04-14 | Stop reason: SDUPTHER

## 2020-06-15 RX ORDER — RITONAVIR 100 MG/1
TABLET ORAL
Qty: 60 TAB | Refills: 5 | Status: SHIPPED | OUTPATIENT
Start: 2020-06-16 | End: 2021-04-14 | Stop reason: SDUPTHER

## 2020-06-15 NOTE — TELEPHONE ENCOUNTER
Facility requesting refills, prescriptions on file at pharmacy have no remaining refills. Last telemed appt 5/20. If appropriate, please send electronically to [facility] (recently changed name). Thanks

## 2020-08-24 PROCEDURE — RXMED WILLOW AMBULATORY MEDICATION CHARGE: Performed by: INTERNAL MEDICINE

## 2020-09-02 ENCOUNTER — TELEMEDICINE2 (OUTPATIENT)
Dept: VASCULAR LAB | Facility: MEDICAL CENTER | Age: 80
End: 2020-09-02
Attending: INTERNAL MEDICINE
Payer: OTHER GOVERNMENT

## 2020-09-02 DIAGNOSIS — N18.4 CHRONIC KIDNEY DISEASE (CKD) STAGE G4/A1, SEVERELY DECREASED GLOMERULAR FILTRATION RATE (GFR) BETWEEN 15-29 ML/MIN/1.73 SQUARE METER AND ALBUMINURIA CREATININE RATIO LESS THAN 30 MG/G (HCC): ICD-10-CM

## 2020-09-02 DIAGNOSIS — E78.49 OTHER HYPERLIPIDEMIA: ICD-10-CM

## 2020-09-02 DIAGNOSIS — B20 HIV DISEASE (HCC): ICD-10-CM

## 2020-09-02 DIAGNOSIS — B20 HIV INFECTION, UNSPECIFIED SYMPTOM STATUS (HCC): ICD-10-CM

## 2020-09-02 DIAGNOSIS — I10 ESSENTIAL HYPERTENSION: ICD-10-CM

## 2020-09-02 PROCEDURE — 99214 OFFICE O/P EST MOD 30 MIN: CPT | Performed by: INTERNAL MEDICINE

## 2020-09-02 RX ORDER — RITONAVIR 100 MG/1
100 TABLET ORAL 2 TIMES DAILY WITH MEALS
Qty: 60 TAB | Refills: 11 | Status: SHIPPED | OUTPATIENT
Start: 2020-09-02

## 2020-09-02 RX ORDER — RALTEGRAVIR 400 MG/1
400 TABLET, FILM COATED ORAL 2 TIMES DAILY
Qty: 60 TAB | Refills: 11 | Status: SHIPPED | OUTPATIENT
Start: 2020-09-02
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
## 2020-09-04 ENCOUNTER — PHARMACY VISIT (OUTPATIENT)
Dept: PHARMACY | Facility: MEDICAL CENTER | Age: 80
End: 2020-09-04
Payer: OTHER GOVERNMENT

## 2020-10-06 PROCEDURE — RXMED WILLOW AMBULATORY MEDICATION CHARGE: Performed by: INTERNAL MEDICINE

## 2020-10-07 ENCOUNTER — PHARMACY VISIT (OUTPATIENT)
Dept: PHARMACY | Facility: MEDICAL CENTER | Age: 80
End: 2020-10-07
Payer: OTHER GOVERNMENT

## 2020-11-04 ENCOUNTER — PHARMACY VISIT (OUTPATIENT)
Dept: PHARMACY | Facility: MEDICAL CENTER | Age: 80
End: 2020-11-04
Payer: OTHER GOVERNMENT

## 2020-11-04 PROCEDURE — RXMED WILLOW AMBULATORY MEDICATION CHARGE: Performed by: INTERNAL MEDICINE

## 2020-12-09 ENCOUNTER — PHARMACY VISIT (OUTPATIENT)
Dept: PHARMACY | Facility: MEDICAL CENTER | Age: 80
End: 2020-12-09
Payer: OTHER GOVERNMENT

## 2020-12-09 PROCEDURE — RXMED WILLOW AMBULATORY MEDICATION CHARGE: Performed by: INTERNAL MEDICINE

## 2021-02-02 PROCEDURE — RXMED WILLOW AMBULATORY MEDICATION CHARGE: Performed by: INTERNAL MEDICINE

## 2021-02-17 ENCOUNTER — PHARMACY VISIT (OUTPATIENT)
Dept: PHARMACY | Facility: MEDICAL CENTER | Age: 81
End: 2021-02-17
Payer: OTHER GOVERNMENT

## 2021-02-24 PROCEDURE — RXMED WILLOW AMBULATORY MEDICATION CHARGE: Performed by: INTERNAL MEDICINE

## 2021-03-01 ENCOUNTER — PHARMACY VISIT (OUTPATIENT)
Dept: PHARMACY | Facility: MEDICAL CENTER | Age: 81
End: 2021-03-01
Payer: OTHER GOVERNMENT

## 2021-04-12 PROCEDURE — RXMED WILLOW AMBULATORY MEDICATION CHARGE: Performed by: INTERNAL MEDICINE

## 2021-04-13 ENCOUNTER — PHARMACY VISIT (OUTPATIENT)
Dept: PHARMACY | Facility: MEDICAL CENTER | Age: 81
End: 2021-04-13
Payer: OTHER GOVERNMENT

## 2021-04-14 DIAGNOSIS — B20 HIV DISEASE (HCC): ICD-10-CM

## 2021-04-14 DIAGNOSIS — B20 HIV INFECTION, UNSPECIFIED SYMPTOM STATUS (HCC): ICD-10-CM

## 2021-04-14 RX ORDER — RITONAVIR 100 MG/1
TABLET ORAL
Qty: 60 TABLET | Refills: 0 | Status: SHIPPED | OUTPATIENT
Start: 2021-04-14

## 2021-04-14 RX ORDER — RALTEGRAVIR 400 MG/1
400 TABLET, FILM COATED ORAL 2 TIMES DAILY
Qty: 60 TABLET | Refills: 0 | Status: SHIPPED | OUTPATIENT
Start: 2021-04-14

## 2021-04-14 NOTE — PROGRESS NOTES
The patient has declined significantly and is now on hospice but is requesting to continue his HIV medications.  This is not generally recommended but would discuss with hospice care provider.  Will provide limited refill for now.      Barbi Pena MD  Infectious Diseases

## 2021-12-20 NOTE — ASSESSMENT & PLAN NOTE
[facility]  FOLLOW UP    January 3, 2022    Pain Management Follow Up    CHIEF COMPLAINT:  Chief Complaint   Patient presents with   • Follow-up     multiple pain complaints including bilateral knee pain and left hip pain       HISTORY OF PRESENT ILLNESS:  Mark is followed by the pain clinic for multiple pain complaints including bilateral knee pain and left hip pain. He also has a history of chronic low back pain, intermittent radiculopathy and neurogenic claudication. In addition he has associated polyneuropathy. He returns today reporting improvement of right shoulder symptoms following most recent intra-articular injection of the right 1st MCP joint concurrent with trigger point injections performed on 10/07/2021. He mentions he feels he received more relief the first time this was performed. He complains of pain involving his left knee that is moderate in nature. He feels his knee pain is contributing to his hip pain. He did previously undergo a left knee patellofemoral joint arthroplasty (in 2017). For his complaints of left knee pain, he did previously vocalize receiving significant improvement of symptoms following most recent coolief radiofrequency ablation of the left knee performed on 06/08/2021. He has been using biofreeze with reported benefit. He has also tried using voltaren gel in the past with no reported benefit. He also presents with concerns of ongoing pain involving the right side of his neck. He notes often he will hear a clicking noise coming from his neck and wonders why. Lastly he also presents with pain involving his right hand. He mentions this is the reason why he quit drinking beer as he has trouble getting the caps off of the beer bottle. He does describe associated sleep disturbance. He does not indicate any current/specific associated radicular component. He does report associated numbness, weakness and dysesthesias in his fingertips. He denies bowel or bladder  Currently no lesions.    incontinence. He is not performing a home exercise and stretching regimen.    He reports improvement of right shoulder pain following most recent procedure.    · Severity - Current is 5/10; best is 5/10; worst is 8/10.  · Sitting tolerance - 10-30 minutes  · Walking tolerance - 30-60 minutes  · Any new numbness or weakness in your limbs since your last visit with Dr. Grewal - Yes  · Any new medical condition since your last visit with Dr. Grewal - No  · Any significant change in your lifestyle since your last visit with Dr. Grewal - No  · Any new treatment/x-rays/MRI since your last visit with Dr. Grewal - No  · Any sleep disturbance - Yes, maintaining  · Any aberrant behavior - No  · Any positive urine drug screen-  No  · Pill count performed - No    ALLERGIES:   Allergen Reactions   • Penicillins ANAPHYLAXIS   • Morphine RASH and PRURITUS   • Bee Sting HIVES     Ant bite is the correct agent that causes itching and trouble breathing       MEDICATIONS:  hydroCORTisone (CORTIZONE) 2.5 % cream  finasteride (PROSCAR) 5 MG tablet  meclizine (ANTIVERT) 25 MG tablet  atorvastatin (LIPITOR) 10 MG tablet  losartan (COZAAR) 100 MG tablet  hydrochlorothiazide (HYDRODIURIL) 12.5 MG tablet  ketoconazole (NIZORAL) 2 % cream  ketoconazole (NIZORAL) 2 % shampoo  tamsulosin (FLOMAX) 0.4 MG Cap  hydroCORTisone (hydroCORTisone) 2.5 % rectal cream  vitamin B-12 (CYANOCOBALAMIN) 1000 MCG tablet  sildenafil (VIAGRA) 50 MG tablet  fluticasone propionate (FLOVENT HFA) 110 MCG/ACT inhaler  EPINEPHrine 0.3 MG/0.3ML auto-injector  Polyethylene Glycol Powder  ALPRAZolam (XANAX) 0.5 MG tablet  clindamycin (CLEOCIN) 300 MG capsule  Multiple Vitamins-Minerals (ICAPS AREDS 2) Cap  Omega-3 Fatty Acids (FISH OIL) 1200 MG capsule  albuterol-ipratropium 2.5 mg/0.5 mg (DUONEB) 0.5-2.5 (3) MG/3ML nebulizer solution  saccharomyces boulardii (FLORASTOR) 250 MG capsule  cholecalciferol (VITAMIN D3) 1000 UNITS tablet    No current  facility-administered medications on file prior to encounter.      Past Medical History, Past Surgical History, Family History and Social History: Reviewed and updated in Epic.    REVIEW OF SYSTEMS:  Pertinent positive ROS are check marked ([x]).  All other ROS are negative.    Constitutional:  []  Chills  []  Fatigue  []  Fever  []  Insomnia  []  Weight Gain  []  Weight Loss    Musculoskeletal:  [x]  Joint Pain  [x]  Leg Pain  []  Arm Pain  [x]  Low Back Pain  [x]  Mid Back Pain  [x]  Neck Pain  [x]  Cold Feet  [x]  Varicose Veins   Neuro/Psychiatric:   []  Anxiety  []  Depression  []  Fainting  []  Headache  []  Clumsiness  []  Memory Loss  []  Seizures             PHYSICAL EXAMINATION:  VITALS:    Visit Vitals  /59 (BP Location: RUE - Right upper extremity, Patient Position: Sitting)   Pulse 62   Temp 98.2 °F (36.8 °C) (Temporal)   Resp 16   Ht 6' (1.829 m)   Wt 77.6 kg (171 lb)   SpO2 96%   BMI 23.19 kg/m²       GENERAL: Reveals a well-developed, well-nourished male who is alert and oriented X 3. He is seated comfortably.  Patient seated with marked upper thoracic kyphosis and severe head forward position which only partially corrects with cuing.  SKIN: Warm, dry and intact. There are no rashes, lesions or ulcerations noted. Extensive varicose veins involving the lower extremities more pronounced in the right as compared to left.  EXTREMITIES: No clubbing or cyanosis, negative edema.  MUSCULOSKELETAL: Moderate to severe mid and upper thoracic kyphosis with moderate to severe head forward position which partially corrects with verbal cueing. Diffuse tenderness extrude the right posterior cervical paravertebrals extending into the right trapezius  and levator complex with several distinct trigger points present. Nontender to range of motion testing of either the left or right shoulder.  Mild tenderness isolating over the right 1st MCP joint.  Mild discomfort with patellofemoral compression flexion extension  of the left knee.  Mild-to-moderate tenderness overlying the medial joint line.  No instability.  Nonpainful range of motion testing of the left hip.  No significant bony tenderness isolating to the hip.    NEUROLOGIC: Patient is alert and oriented x3. Speech is normal. Patient has intact recent and remote memory. Deep tendon reflexes are 1/4 equal symmetrical to patella and Achilles. No clonus present. Patient is normal tone and bulk. Strength is full equal and symmetrical involving the bilateral lower extremities. Some diminished hamstring flexibility but no overt nerve root retraction signs present.       On 01/03/2022, Kalyani WEIR scribed the services personally performed by Maurice Grewal MD.    DIAGNOSTIC DATA:  No new diagnostic study    I KERLINE A G I N G    R E P O R T      Procedure(s) Performed Exam Date/Time Accession Number Ordering Provider   XR KNEE 3 VW BILATERAL 12/05/2018  2:26 PM [id number] Gerardo Padilla         Reason for Exam:   Diagnosis:   F/u Bilateral Knee Pain   Acute pain of both knees          EXAM: XR KNEE 3 VW BILATERAL.     HISTORY: F/u Bilateral Knee Pain. Pain both knees.     COMPARISON: 04/25/2018, 11/29/2017.         TECHNIQUE: AP standing view both knees. 2 additional views both knees.           FINDINGS/IMPRESSION:      Mild to moderate degenerative arthritis both knees. Medial and lateral  compartment joint space narrowing, more prominent on the right.      Left patellofemoral resurfacing.     Mild right and mild to moderate left knee joint effusions and/or synovitis.  Again noted bilateral patellar spurs. Unchanged small heterotopic  ossification distal left Achilles tendon.     Vascular calcifications.                   Electronically Signed By: Cheo Contreras MD on 12/7/2018 11:20 AM          Assessment Metrics  Misuse Risk   ORT   3   03/18/2019 Pain/Function   BPI   2   01/03/2022 Quality of Life   QOLS   9   06/21/2021 Depression   PHQ-2/9   0   04/26/2021 Anxiety    JAMIE-7   2   06/21/2021       DISCUSSION:  I reinforced the importance of regular activity. I discussed the benefits of home regimen such as yoga and emma chi, and the necessity of a consistent home stretching and exercise regimen. I reviewed appropriate back hygiene, postural awareness and core strengthening. I acknowledge that activity modification was important component working on strength and endurance in a gradual manner. I recommended against sedentary lifestyle discussing the benefits of good nutrition, a healthy lifestyle, and remaining as active as possible. I have added a compound cream into his regimen and discussed the risks associated with this along with appropriate use of this medication. Given past positive response and most recent exacerbation of symptoms together we agreed on scheduling trigger point injections.  We discussed his left knee pain as well.  He does not wish to pursue revision and total joint replacement.  He has responded to genicular radiofrequency ablation is which we could repeat in the future.  At this time I recommended monitoring conservatively.  All questions have been answered.      IMPRESSION:  1. Primary osteoarthritis involving multiple joints    2. Cervicalgia    3. Myofascial pain    4. Lumbosacral spondylosis without myelopathy    5. Arthralgia of left knee      RECOMMENDATIONS:  No orders of the defined types were placed in this encounter.    Return for trigger point injections.  · The patient is encouraged to continue home stretching and exercise regimen as previously outlined in therapy and clinic.  · Brief Pain Inventory assessment was performed, patient is exhibiting mild impact with a score of 18/70.  · Medication changes as follows:  Patient is initiated onto a compound cream.    Face to face time with the patient was 30 minutes with greater than 50% spent in discussion, patient education and counseling.    The documentation recorded by the scribe accurately and  completely reflects the service(s) I personally performed and the decisions made by me.     Maurice Grewal MD  [facility] [place]
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
## 2023-11-13 NOTE — ASSESSMENT & PLAN NOTE
NDOC monitors.: recommended follow up for Chronic Care. Nocturia   Double Z Plasty Text: The lesion was extirpated to the level of the fat with a #15 scalpel blade. Given the location of the defect, shape of the defect and the proximity to free margins a double Z-plasty was deemed most appropriate for repair. Using a sterile surgical marker, the appropriate transposition arms of the double Z-plasty were drawn incorporating the defect and placing the expected incisions within the relaxed skin tension lines where possible. The area thus outlined was incised deep to adipose tissue with a #15 scalpel blade. The skin margins were undermined to an appropriate distance in all directions utilizing iris scissors. The opposing transposition arms were then transposed and carried over into place in opposite direction and anchored with interrupted buried subcutaneous sutures.